# Patient Record
Sex: FEMALE | Race: WHITE | Employment: FULL TIME | URBAN - METROPOLITAN AREA
[De-identification: names, ages, dates, MRNs, and addresses within clinical notes are randomized per-mention and may not be internally consistent; named-entity substitution may affect disease eponyms.]

---

## 2019-12-09 ENCOUNTER — TELEPHONE (OUTPATIENT)
Dept: PODIATRY | Age: 64
End: 2019-12-09

## 2021-12-20 ENCOUNTER — HOSPITAL ENCOUNTER (EMERGENCY)
Age: 66
Discharge: HOME | End: 2021-12-20
Attending: EMERGENCY MEDICINE
Payer: COMMERCIAL

## 2021-12-20 ENCOUNTER — APPOINTMENT (OUTPATIENT)
Dept: GENERAL RADIOLOGY | Age: 66
End: 2021-12-20
Payer: COMMERCIAL

## 2021-12-20 VITALS
HEIGHT: 60 IN | SYSTOLIC BLOOD PRESSURE: 156 MMHG | RESPIRATION RATE: 16 BRPM | HEART RATE: 86 BPM | WEIGHT: 140 LBS | OXYGEN SATURATION: 98 % | TEMPERATURE: 97 F | DIASTOLIC BLOOD PRESSURE: 69 MMHG | BODY MASS INDEX: 27.48 KG/M2

## 2021-12-20 DIAGNOSIS — R07.89 ATYPICAL CHEST PAIN: Primary | ICD-10-CM

## 2021-12-20 LAB
ALBUMIN SERPL-MCNC: 4.4 G/DL (ref 3.5–5.2)
ALP BLD-CCNC: 121 U/L (ref 35–104)
ALT SERPL-CCNC: 13 U/L (ref 0–32)
ANION GAP SERPL CALCULATED.3IONS-SCNC: 11 MMOL/L (ref 7–16)
AST SERPL-CCNC: 19 U/L (ref 0–31)
BACTERIA: NORMAL /HPF
BASOPHILS ABSOLUTE: 0.06 E9/L (ref 0–0.2)
BASOPHILS RELATIVE PERCENT: 1 % (ref 0–2)
BILIRUB SERPL-MCNC: 0.2 MG/DL (ref 0–1.2)
BILIRUBIN URINE: NEGATIVE
BLOOD, URINE: ABNORMAL
BUN BLDV-MCNC: 9 MG/DL (ref 6–23)
CALCIUM SERPL-MCNC: 9.7 MG/DL (ref 8.6–10.2)
CHLORIDE BLD-SCNC: 102 MMOL/L (ref 98–107)
CLARITY: CLEAR
CO2: 27 MMOL/L (ref 22–29)
COLOR: YELLOW
CREAT SERPL-MCNC: 0.7 MG/DL (ref 0.5–1)
EKG ATRIAL RATE: 82 BPM
EKG P AXIS: 68 DEGREES
EKG P-R INTERVAL: 126 MS
EKG Q-T INTERVAL: 344 MS
EKG QRS DURATION: 86 MS
EKG QTC CALCULATION (BAZETT): 401 MS
EKG R AXIS: 72 DEGREES
EKG T AXIS: 47 DEGREES
EKG VENTRICULAR RATE: 82 BPM
EOSINOPHILS ABSOLUTE: 0.15 E9/L (ref 0.05–0.5)
EOSINOPHILS RELATIVE PERCENT: 2.6 % (ref 0–6)
GFR AFRICAN AMERICAN: >60
GFR NON-AFRICAN AMERICAN: >60 ML/MIN/1.73
GLUCOSE BLD-MCNC: 90 MG/DL (ref 74–99)
GLUCOSE URINE: NEGATIVE MG/DL
HCT VFR BLD CALC: 40.8 % (ref 34–48)
HEMOGLOBIN: 13.9 G/DL (ref 11.5–15.5)
IMMATURE GRANULOCYTES #: 0.01 E9/L
IMMATURE GRANULOCYTES %: 0.2 % (ref 0–5)
KETONES, URINE: NEGATIVE MG/DL
LEUKOCYTE ESTERASE, URINE: ABNORMAL
LYMPHOCYTES ABSOLUTE: 2.13 E9/L (ref 1.5–4)
LYMPHOCYTES RELATIVE PERCENT: 37 % (ref 20–42)
MCH RBC QN AUTO: 29.4 PG (ref 26–35)
MCHC RBC AUTO-ENTMCNC: 34.1 % (ref 32–34.5)
MCV RBC AUTO: 86.3 FL (ref 80–99.9)
MONOCYTES ABSOLUTE: 0.73 E9/L (ref 0.1–0.95)
MONOCYTES RELATIVE PERCENT: 12.7 % (ref 2–12)
NEUTROPHILS ABSOLUTE: 2.68 E9/L (ref 1.8–7.3)
NEUTROPHILS RELATIVE PERCENT: 46.5 % (ref 43–80)
NITRITE, URINE: NEGATIVE
PDW BLD-RTO: 12.4 FL (ref 11.5–15)
PH UA: 6 (ref 5–9)
PLATELET # BLD: 234 E9/L (ref 130–450)
PMV BLD AUTO: 10.7 FL (ref 7–12)
POTASSIUM REFLEX MAGNESIUM: 4.1 MMOL/L (ref 3.5–5)
PROTEIN UA: NEGATIVE MG/DL
RBC # BLD: 4.73 E12/L (ref 3.5–5.5)
RBC UA: NORMAL /HPF (ref 0–2)
SODIUM BLD-SCNC: 140 MMOL/L (ref 132–146)
SPECIFIC GRAVITY UA: <=1.005 (ref 1–1.03)
TOTAL PROTEIN: 8 G/DL (ref 6.4–8.3)
TROPONIN, HIGH SENSITIVITY: <6 NG/L (ref 0–9)
TROPONIN, HIGH SENSITIVITY: <6 NG/L (ref 0–9)
UROBILINOGEN, URINE: 0.2 E.U./DL
WBC # BLD: 5.8 E9/L (ref 4.5–11.5)
WBC UA: NORMAL /HPF (ref 0–5)

## 2021-12-20 PROCEDURE — 81001 URINALYSIS AUTO W/SCOPE: CPT

## 2021-12-20 PROCEDURE — 85025 COMPLETE CBC W/AUTO DIFF WBC: CPT

## 2021-12-20 PROCEDURE — 93010 ELECTROCARDIOGRAM REPORT: CPT | Performed by: INTERNAL MEDICINE

## 2021-12-20 PROCEDURE — 93005 ELECTROCARDIOGRAM TRACING: CPT | Performed by: EMERGENCY MEDICINE

## 2021-12-20 PROCEDURE — 80053 COMPREHEN METABOLIC PANEL: CPT

## 2021-12-20 PROCEDURE — 84484 ASSAY OF TROPONIN QUANT: CPT

## 2021-12-20 PROCEDURE — 99283 EMERGENCY DEPT VISIT LOW MDM: CPT

## 2021-12-20 PROCEDURE — 71046 X-RAY EXAM CHEST 2 VIEWS: CPT

## 2021-12-20 ASSESSMENT — ENCOUNTER SYMPTOMS
ABDOMINAL PAIN: 0
VOMITING: 0
COUGH: 0
NAUSEA: 0
DIARRHEA: 0
EYE PAIN: 0
WHEEZING: 0
BACK PAIN: 0
SORE THROAT: 0
SHORTNESS OF BREATH: 0

## 2021-12-20 ASSESSMENT — PAIN DESCRIPTION - LOCATION: LOCATION: CHEST

## 2021-12-20 ASSESSMENT — PAIN DESCRIPTION - PAIN TYPE: TYPE: ACUTE PAIN

## 2021-12-20 NOTE — ED PROVIDER NOTES
HPI   Patient is a 78 y.o. female with a past medical history of noncontributory, presenting to the Emergency Department for chest pain. History obtained by patient. Symptoms are mild in severity and intermittent since onset. They are improved by drinking water and taking Tums and worsened by nothing. Patient presents for chest pain. She describes as a burning sensation in the middle of her chest.  Does not radiate anywhere. It happened this morning when she woke up. She states it lasted approximately 30 minutes to an hour. The symptoms resolved after she choked water. She states she is had the same pain multiple times in the past.  She states every time she attempts to drink something and usually she can drink a carbonated beverage over symptoms resolved. Last time it happened was approximately a week ago. Also happen multiple times the last few months. She states she has indigestion. She told her family about the symptoms she had today and they instructed her to come to the ER for evaluation. She does not have any chest pain currently. She states her symptoms resolved after drinking her water. The pain did not radiate anywhere. She denies any shortness of breath. She denies any leg pain or leg swelling. No history of DVT or PE. She denies any cardiac history. She does state that she has cardiac history in the family and states that her dad had a heart attack which she is thinks he was greater than 55 at that time. Review of Systems   Constitutional: Negative for chills and fever. HENT: Negative for congestion and sore throat. Eyes: Negative for pain and visual disturbance. Respiratory: Negative for cough, shortness of breath and wheezing. Cardiovascular: Positive for chest pain. Negative for leg swelling. Gastrointestinal: Negative for abdominal pain, diarrhea, nausea and vomiting. Genitourinary: Negative for dysuria and frequency.    Musculoskeletal: Negative for arthralgias and back pain. Skin: Negative for rash and wound. Neurological: Negative for weakness and headaches. All other systems reviewed and are negative. Physical Exam  Vitals and nursing note reviewed. Constitutional:       General: She is not in acute distress. Appearance: She is well-developed. She is not ill-appearing. HENT:      Head: Normocephalic and atraumatic. Eyes:      Extraocular Movements: Extraocular movements intact. Cardiovascular:      Rate and Rhythm: Normal rate and regular rhythm. Pulses: Normal pulses. Pulmonary:      Effort: Pulmonary effort is normal. No respiratory distress. Breath sounds: Normal breath sounds. No wheezing or rales. Abdominal:      Palpations: Abdomen is soft. Tenderness: There is no abdominal tenderness. There is no guarding or rebound. Musculoskeletal:         General: Normal range of motion. Cervical back: Normal range of motion and neck supple. Right lower leg: No edema. Left lower leg: No edema. Skin:     General: Skin is warm and dry. Capillary Refill: Capillary refill takes less than 2 seconds. Neurological:      Mental Status: She is alert and oriented to person, place, and time. Cranial Nerves: No cranial nerve deficit. Coordination: Coordination normal.          Procedures     MDM   Patient presented to the Emergency Department for chest pain. They are clinically stable, vital signs stable, non toxic appearing. Work up initiated. EKG reassuring. Troponin <6 x 2. H&P and HPI less concerning for ACS. HEART 2 and only points for age. No concern for PE, asymptomatic at this time. Rest of labs and work up reassuring. No concern for dissection. Strict return precautions were discussed including but not limited too worsening or recurrence of pain, dyspnea, leg pain or swelling, new or worsening symtpoms. They verbalized understanding and were agreeable with the plan.  All questions were answered and patient was discharged. --------------------------------------------- PAST HISTORY ---------------------------------------------  Past Medical History:  has no past medical history on file. Past Surgical History:  has a past surgical history that includes Breast cyst excision. Social History:  reports that she has quit smoking. She has never used smokeless tobacco. She reports that she does not drink alcohol and does not use drugs. Family History: family history includes Breast Cancer in her mother; Cancer in her brother and mother; Heart Disease in her father. The patients home medications have been reviewed.     Allergies: Latex    -------------------------------------------------- RESULTS -------------------------------------------------  Labs:  Results for orders placed or performed during the hospital encounter of 12/20/21   CBC Auto Differential   Result Value Ref Range    WBC 5.8 4.5 - 11.5 E9/L    RBC 4.73 3.50 - 5.50 E12/L    Hemoglobin 13.9 11.5 - 15.5 g/dL    Hematocrit 40.8 34.0 - 48.0 %    MCV 86.3 80.0 - 99.9 fL    MCH 29.4 26.0 - 35.0 pg    MCHC 34.1 32.0 - 34.5 %    RDW 12.4 11.5 - 15.0 fL    Platelets 246 661 - 319 E9/L    MPV 10.7 7.0 - 12.0 fL    Neutrophils % 46.5 43.0 - 80.0 %    Immature Granulocytes % 0.2 0.0 - 5.0 %    Lymphocytes % 37.0 20.0 - 42.0 %    Monocytes % 12.7 (H) 2.0 - 12.0 %    Eosinophils % 2.6 0.0 - 6.0 %    Basophils % 1.0 0.0 - 2.0 %    Neutrophils Absolute 2.68 1.80 - 7.30 E9/L    Immature Granulocytes # 0.01 E9/L    Lymphocytes Absolute 2.13 1.50 - 4.00 E9/L    Monocytes Absolute 0.73 0.10 - 0.95 E9/L    Eosinophils Absolute 0.15 0.05 - 0.50 E9/L    Basophils Absolute 0.06 0.00 - 0.20 E9/L   Comprehensive Metabolic Panel w/ Reflex to MG   Result Value Ref Range    Sodium 140 132 - 146 mmol/L    Potassium reflex Magnesium 4.1 3.5 - 5.0 mmol/L    Chloride 102 98 - 107 mmol/L    CO2 27 22 - 29 mmol/L    Anion Gap 11 7 - 16 mmol/L    Glucose 90 74 - 99 mg/dL    BUN 9 6 - 23 mg/dL    CREATININE 0.7 0.5 - 1.0 mg/dL    GFR Non-African American >60 >=60 mL/min/1.73    GFR African American >60     Calcium 9.7 8.6 - 10.2 mg/dL    Total Protein 8.0 6.4 - 8.3 g/dL    Albumin 4.4 3.5 - 5.2 g/dL    Total Bilirubin 0.2 0.0 - 1.2 mg/dL    Alkaline Phosphatase 121 (H) 35 - 104 U/L    ALT 13 0 - 32 U/L    AST 19 0 - 31 U/L   Troponin   Result Value Ref Range    Troponin, High Sensitivity <6 0 - 9 ng/L   URINALYSIS   Result Value Ref Range    Color, UA Yellow Straw/Yellow    Clarity, UA Clear Clear    Glucose, Ur Negative Negative mg/dL    Bilirubin Urine Negative Negative    Ketones, Urine Negative Negative mg/dL    Specific Gravity, UA <=1.005 1.005 - 1.030    Blood, Urine TRACE-INTACT Negative    pH, UA 6.0 5.0 - 9.0    Protein, UA Negative Negative mg/dL    Urobilinogen, Urine 0.2 <2.0 E.U./dL    Nitrite, Urine Negative Negative    Leukocyte Esterase, Urine TRACE (A) Negative   Microscopic Urinalysis   Result Value Ref Range    WBC, UA 2-5 0 - 5 /HPF    RBC, UA 2-5 0 - 2 /HPF    Bacteria, UA NONE SEEN None Seen /HPF   Troponin   Result Value Ref Range    Troponin, High Sensitivity <6 0 - 9 ng/L   EKG 12 Lead   Result Value Ref Range    Ventricular Rate 82 BPM    Atrial Rate 82 BPM    P-R Interval 126 ms    QRS Duration 86 ms    Q-T Interval 344 ms    QTc Calculation (Bazett) 401 ms    P Axis 68 degrees    R Axis 72 degrees    T Axis 47 degrees       Radiology:  XR CHEST (2 VW)   Final Result   No acute process. EKG:  This EKG is signed and interpreted by ED Physician. Time:  1236   Rate: 82  Rhythm: Sinus. Interpretation: no acute changes. Normal axis deviation. No STEMI. QTc 401.   Comparison: no previous EKG.      ------------------------- NURSING NOTES AND VITALS REVIEWED ---------------------------  Date / Time Roomed:  12/20/2021  1:02 PM  ED Bed Assignment:  [email or web address]    The nursing notes within the ED encounter and vital signs as below have been reviewed. BP (!) 156/69   Pulse 86   Temp 97 °F (36.1 °C)   Resp 16   Ht 5' (1.524 m)   Wt 140 lb (63.5 kg)   SpO2 98%   BMI 27.34 kg/m²   Oxygen Saturation Interpretation: Normal      ------------------------------------------ PROGRESS NOTES ------------------------------------------  ED COURSE MEDICATIONS:              Medications - No data to display    I have spoken with the patient and discussed todays results, in addition to providing specific details for the plan of care and counseling regarding the diagnosis and prognosis. Their questions are answered at this time and they are agreeable with the plan. I discussed at length with them reasons for immediate return here for re evaluation. They will followup with primary care by calling their office tomorrow. --------------------------------- ADDITIONAL PROVIDER NOTES ---------------------------------  At this time the patient is without objective evidence of an acute process requiring hospitalization or inpatient management. They have remained hemodynamically stable throughout their entire ED visit and are stable for discharge with outpatient follow-up. The plan has been discussed in detail and they are aware of the specific conditions for emergent return, as well as the importance of follow-up. There are no discharge medications for this patient. Diagnosis:  1. Atypical chest pain        Disposition:  Patient's disposition: Discharge to home  Patient's condition is stable.         Leola Moya DO  Resident  12/20/21 2397

## 2021-12-20 NOTE — ED NOTES
Patient states she believes she has indigestion, but came in just to be sure.       Eliseo Can RN  12/20/21 0035

## 2025-02-07 ENCOUNTER — HOSPITAL ENCOUNTER (OUTPATIENT)
Age: 70
Discharge: HOME | End: 2025-02-07
Payer: COMMERCIAL

## 2025-02-07 LAB
25(OH)D3 SERPL-MCNC: 22.1 NG/ML (ref 30–100)
ALBUMIN SERPL-MCNC: 4.4 G/DL (ref 3.5–5.2)
ALP SERPL-CCNC: 109 U/L (ref 35–104)
ALT SERPL-CCNC: 12 U/L (ref 0–32)
ANION GAP SERPL CALCULATED.3IONS-SCNC: 11 MMOL/L (ref 7–16)
AST SERPL-CCNC: 18 U/L (ref 0–31)
BASOPHILS # BLD: 0.06 K/UL (ref 0–0.2)
BASOPHILS NFR BLD: 1 % (ref 0–2)
BILIRUB SERPL-MCNC: 0.4 MG/DL (ref 0–1.2)
BUN SERPL-MCNC: 12 MG/DL (ref 6–23)
CALCIUM SERPL-MCNC: 9.4 MG/DL (ref 8.6–10.2)
CHLORIDE SERPL-SCNC: 102 MMOL/L (ref 98–107)
CHOLEST SERPL-MCNC: 235 MG/DL
CO2 SERPL-SCNC: 27 MMOL/L (ref 22–29)
CREAT SERPL-MCNC: 0.7 MG/DL (ref 0.5–1)
CRP SERPL HS-MCNC: <3 MG/L (ref 0–5)
EOSINOPHIL # BLD: 0.18 K/UL (ref 0.05–0.5)
EOSINOPHILS RELATIVE PERCENT: 3 % (ref 0–6)
ERYTHROCYTE [DISTWIDTH] IN BLOOD BY AUTOMATED COUNT: 12.5 % (ref 11.5–15)
ERYTHROCYTE [SEDIMENTATION RATE] IN BLOOD BY WESTERGREN METHOD: 17 MM/HR (ref 0–20)
GFR, ESTIMATED: 87 ML/MIN/1.73M2
GLUCOSE SERPL-MCNC: 78 MG/DL (ref 74–99)
HBA1C MFR BLD: 5.5 % (ref 4–5.6)
HCT VFR BLD AUTO: 41.5 % (ref 34–48)
HDLC SERPL-MCNC: 76 MG/DL
HGB BLD-MCNC: 13.8 G/DL (ref 11.5–15.5)
IMM GRANULOCYTES # BLD AUTO: <0.03 K/UL (ref 0–0.58)
IMM GRANULOCYTES NFR BLD: 0 % (ref 0–5)
LDLC SERPL CALC-MCNC: 136 MG/DL
LYMPHOCYTES NFR BLD: 2.51 K/UL (ref 1.5–4)
LYMPHOCYTES RELATIVE PERCENT: 38 % (ref 20–42)
MCH RBC QN AUTO: 29.2 PG (ref 26–35)
MCHC RBC AUTO-ENTMCNC: 33.3 G/DL (ref 32–34.5)
MCV RBC AUTO: 87.9 FL (ref 80–99.9)
MONOCYTES NFR BLD: 0.63 K/UL (ref 0.1–0.95)
MONOCYTES NFR BLD: 10 % (ref 2–12)
NEUTROPHILS NFR BLD: 49 % (ref 43–80)
NEUTS SEG NFR BLD: 3.22 K/UL (ref 1.8–7.3)
PLATELET # BLD AUTO: 203 K/UL (ref 130–450)
PMV BLD AUTO: 11.3 FL (ref 7–12)
POTASSIUM SERPL-SCNC: 3.9 MMOL/L (ref 3.5–5)
PROT SERPL-MCNC: 8.3 G/DL (ref 6.4–8.3)
RBC # BLD AUTO: 4.72 M/UL (ref 3.5–5.5)
RHEUMATOID FACT SER NEPH-ACNC: <10 IU/ML (ref 0–13)
SODIUM SERPL-SCNC: 140 MMOL/L (ref 132–146)
TRIGL SERPL-MCNC: 114 MG/DL
TSH SERPL DL<=0.05 MIU/L-ACNC: 1.49 UIU/ML (ref 0.27–4.2)
VLDLC SERPL CALC-MCNC: 23 MG/DL
WBC OTHER # BLD: 6.6 K/UL (ref 4.5–11.5)

## 2025-02-07 PROCEDURE — 83036 HEMOGLOBIN GLYCOSYLATED A1C: CPT

## 2025-02-07 PROCEDURE — 84443 ASSAY THYROID STIM HORMONE: CPT

## 2025-02-07 PROCEDURE — 80061 LIPID PANEL: CPT

## 2025-02-07 PROCEDURE — 85652 RBC SED RATE AUTOMATED: CPT

## 2025-02-07 PROCEDURE — 82306 VITAMIN D 25 HYDROXY: CPT

## 2025-02-07 PROCEDURE — 36415 COLL VENOUS BLD VENIPUNCTURE: CPT

## 2025-02-07 PROCEDURE — 86140 C-REACTIVE PROTEIN: CPT

## 2025-02-07 PROCEDURE — 86431 RHEUMATOID FACTOR QUANT: CPT

## 2025-02-07 PROCEDURE — 85025 COMPLETE CBC W/AUTO DIFF WBC: CPT

## 2025-02-07 PROCEDURE — 80053 COMPREHEN METABOLIC PANEL: CPT
